# Patient Record
Sex: MALE | Race: WHITE | NOT HISPANIC OR LATINO | Employment: FULL TIME | URBAN - NONMETROPOLITAN AREA
[De-identification: names, ages, dates, MRNs, and addresses within clinical notes are randomized per-mention and may not be internally consistent; named-entity substitution may affect disease eponyms.]

---

## 2022-12-31 ENCOUNTER — OFFICE VISIT (OUTPATIENT)
Dept: URGENT CARE | Facility: MEDICAL CENTER | Age: 27
End: 2022-12-31

## 2022-12-31 VITALS
BODY MASS INDEX: 32.18 KG/M2 | WEIGHT: 205 LBS | RESPIRATION RATE: 20 BRPM | HEIGHT: 67 IN | TEMPERATURE: 98.4 F | OXYGEN SATURATION: 96 % | HEART RATE: 83 BPM

## 2022-12-31 DIAGNOSIS — J06.9 ACUTE RESPIRATORY DISEASE: Primary | ICD-10-CM

## 2022-12-31 NOTE — PATIENT INSTRUCTIONS
Vitamin D3 2000 IU daily  Vitamin C 1000mg twice per day  Multivitamin daily  Fluids and rest  Over the counter cold medication as needed (flonase)   Salt water gargles  Throat Coat tea  Follow up with PCP in 3-5 days  Proceed to  ER if symptoms worsen
